# Patient Record
Sex: FEMALE | Race: ASIAN | NOT HISPANIC OR LATINO | Employment: FULL TIME | URBAN - METROPOLITAN AREA
[De-identification: names, ages, dates, MRNs, and addresses within clinical notes are randomized per-mention and may not be internally consistent; named-entity substitution may affect disease eponyms.]

---

## 2022-11-21 DIAGNOSIS — J06.9 UPPER RESPIRATORY TRACT INFECTION, UNSPECIFIED TYPE: Primary | ICD-10-CM

## 2022-11-21 RX ORDER — AZITHROMYCIN 250 MG/1
TABLET, FILM COATED ORAL
Qty: 6 TABLET | Refills: 0 | Status: SHIPPED | OUTPATIENT
Start: 2022-11-21 | End: 2022-11-26

## 2022-11-21 NOTE — PROGRESS NOTES
Pt is traveling overseas to [place] and is requesting antibiotics to have on hand to cover for URI and travelers diarrhea.  Z pack sent per pt request.  Pt advise to see CDC travel guidelines to [place].  https://wwwnc.cdc.gov/travel/destinations/traveler/none/[place]  All questions answered, voiced understanding.

## 2023-10-31 ENCOUNTER — PATIENT OUTREACH (OUTPATIENT)
Dept: ADMINISTRATIVE | Facility: OTHER | Age: 48
End: 2023-10-31
Payer: COMMERCIAL

## 2023-10-31 ENCOUNTER — OFFICE VISIT (OUTPATIENT)
Dept: FAMILY MEDICINE | Facility: CLINIC | Age: 48
End: 2023-10-31
Payer: COMMERCIAL

## 2023-10-31 VITALS
SYSTOLIC BLOOD PRESSURE: 106 MMHG | BODY MASS INDEX: 21.02 KG/M2 | HEART RATE: 75 BPM | DIASTOLIC BLOOD PRESSURE: 78 MMHG | WEIGHT: 104.06 LBS | OXYGEN SATURATION: 97 % | TEMPERATURE: 99 F

## 2023-10-31 DIAGNOSIS — H72.91 PERFORATION OF RIGHT TYMPANIC MEMBRANE: Primary | ICD-10-CM

## 2023-10-31 PROCEDURE — 99213 PR OFFICE/OUTPT VISIT, EST, LEVL III, 20-29 MIN: ICD-10-PCS | Mod: S$GLB,,, | Performed by: INTERNAL MEDICINE

## 2023-10-31 PROCEDURE — 99999 PR PBB SHADOW E&M-EST. PATIENT-LVL III: ICD-10-PCS | Mod: PBBFAC,,, | Performed by: INTERNAL MEDICINE

## 2023-10-31 PROCEDURE — 99213 OFFICE O/P EST LOW 20 MIN: CPT | Mod: S$GLB,,, | Performed by: INTERNAL MEDICINE

## 2023-10-31 PROCEDURE — 99999 PR PBB SHADOW E&M-EST. PATIENT-LVL III: CPT | Mod: PBBFAC,,, | Performed by: INTERNAL MEDICINE

## 2023-10-31 RX ORDER — OFLOXACIN 3 MG/ML
5 SOLUTION AURICULAR (OTIC) DAILY
Qty: 10 ML | Refills: 0 | Status: SHIPPED | OUTPATIENT
Start: 2023-10-31 | End: 2023-11-07

## 2023-10-31 RX ORDER — AMOXICILLIN AND CLAVULANATE POTASSIUM 875; 125 MG/1; MG/1
1 TABLET, FILM COATED ORAL EVERY 12 HOURS
Qty: 14 TABLET | Refills: 0 | Status: SHIPPED | OUTPATIENT
Start: 2023-10-31 | End: 2023-11-07

## 2023-10-31 NOTE — PROGRESS NOTES
Chief Complaint: Ear Drainage (No pain, pt has had drainage since Saturday and more comes out when sleeping)      Ines Bates  is a 47 y.o. year old with a PMH of  has no past medical history on file. who presents today for right ear discharge    Patient noticed clear, slightly pink discharge from her right ear on Saturday.  She denied issues with hearing, tinnitus, fevers, chills or ear pain.  She does think that maybe she had a sinus infection recently.  She usually has issues with her left ear and buildup wax for which she uses peroxide and Q-tips.  She is not used any medication since Saturdays.  The ear discharge has reduced since Saturday.  She does not having any allergies to penicillin.     No past surgical history on file.     Family History   Problem Relation Age of Onset    Hypertension Father     Diabetes Father     Coronary artery disease Neg Hx     Breast cancer Neg Hx     Ovarian cancer Neg Hx     Colon cancer Neg Hx         Social History     Socioeconomic History    Marital status:    Occupational History    Occupation:    Tobacco Use    Smoking status: Never    Smokeless tobacco: Never   Social History Narrative    ** Merged History Encounter **          Social Determinants of Health     Financial Resource Strain: Low Risk  (10/31/2023)    Overall Financial Resource Strain (CARDIA)     Difficulty of Paying Living Expenses: Not hard at all   Food Insecurity: No Food Insecurity (10/31/2023)    Hunger Vital Sign     Worried About Running Out of Food in the Last Year: Never true     Ran Out of Food in the Last Year: Never true   Transportation Needs: No Transportation Needs (10/31/2023)    PRAPARE - Transportation     Lack of Transportation (Medical): No     Lack of Transportation (Non-Medical): No   Physical Activity: Inactive (10/31/2023)    Exercise Vital Sign     Days of Exercise per Week: 0 days     Minutes of Exercise per Session: 0 min   Stress: No Stress Concern Present  (10/31/2023)    [employer] [place] [employer] - Occupational Stress Questionnaire     Feeling of Stress : Not at all   Social Connections: Moderately Isolated (10/31/2023)    Social Connection and Isolation Panel [NHANES]     Frequency of Communication with Friends and Family: Once a week     Frequency of Social Gatherings with Friends and Family: More than three times a week     Attends Je[employer] witness Services: Never     Active Member of Clubs or Organizations: No     Attends Club or Organization Meetings: Never     Marital Status:    Housing Stability: Low Risk  (10/31/2023)    Housing Stability Vital Sign     Unable to Pay for Housing in the Last Year: No     Number of Places Lived in the Last Year: 1     Unstable Housing in the Last Year: No         Current Outpatient Medications:     amoxicillin-clavulanate 875-125mg (AUGMENTIN) 875-125 mg per tablet, Take 1 tablet by mouth every 12 (twelve) hours. for 7 days, Disp: 14 tablet, Rfl: 0    ofloxacin (FLOXIN) 0.3 % otic solution, Place 5 drops into the right ear once daily. for 7 days, Disp: 10 mL, Rfl: 0     Review of Systems   Constitutional:  Negative for chills and fever.   HENT:  Positive for ear discharge. Negative for congestion, ear pain, hearing loss, sinus pain and tinnitus.         Objective:      Vitals:    10/31/23 1014   BP: 106/78   Pulse: 75   Temp: 99.3 °F (37.4 °C)       Physical Exam  Constitutional:       Appearance: Normal appearance.   HENT:      Head: Normocephalic and atraumatic.      Right Ear: Ear canal and external ear normal. There is no impacted cerumen.      Left Ear: Tympanic membrane, ear canal and external ear normal. There is no impacted cerumen.      Ears:        Nose: Nose normal. No congestion or rhinorrhea.      Mouth/Throat:      Mouth: Mucous membranes are moist.      Pharynx: Oropharynx is clear. No oropharyngeal exudate or posterior oropharyngeal erythema.   Neurological:      Mental Status: She is alert.           Assessment:       1. Perforation of right tympanic membrane          Plan:   1. Perforation of right tympanic membrane  Assessment & Plan:  Acute, improving.  Refer to HPI.  Patient not showing any signs systemic infection     - we will start patient on oral and topical antibiotics.  Follow up in 2 months to monitor the perforation.  If perfusion has not improved in 12 weeks may have to treat as chronic tympanic membrane perforation  - advised patient to avoid getting water into her ears until the perforation has caused  - advised patient against use of Q-tips in both ears    Orders:  -     ofloxacin (FLOXIN) 0.3 % otic solution; Place 5 drops into the right ear once daily. for 7 days  Dispense: 10 mL; Refill: 0  -     amoxicillin-clavulanate 875-125mg (AUGMENTIN) 875-125 mg per tablet; Take 1 tablet by mouth every 12 (twelve) hours. for 7 days  Dispense: 14 tablet; Refill: 0         Follow up in about 2 months (around 12/31/2023) for right TM perforation .

## 2023-10-31 NOTE — PROGRESS NOTES
Health Maintenance Due   Topic     COVID-19 Vaccine (1) Not offered at this office    HIV Screening  Consult pcp    TETANUS VACCINE  Pt decline    Mammogram  Consult pcp    Colorectal Cancer Screening  Consult pcp    Influenza Vaccine (1) Pt decline

## 2023-10-31 NOTE — ASSESSMENT & PLAN NOTE
Acute, improving.  Refer to HPI.  Patient not showing any signs systemic infection     - we will start patient on oral and topical antibiotics.  Follow up in 2 months to monitor the perforation.  If perfusion has not improved in 12 weeks may have to treat as chronic tympanic membrane perforation  - advised patient to avoid getting water into her ears until the perforation has caused  - advised patient against use of Q-tips in both ears

## 2023-10-31 NOTE — PROGRESS NOTES
CHW - Initial Contact    This Community Health Worker completed the Social Determinant of Health questionnaire with patient in clinic today.    Ms Bates reports to have no social barriers or concerns at this time.  Follow up required: Y  Follow-up Outreach - Due: 11/15/2023

## 2023-11-30 ENCOUNTER — PATIENT OUTREACH (OUTPATIENT)
Dept: ADMINISTRATIVE | Facility: OTHER | Age: 48
End: 2023-11-30
Payer: COMMERCIAL